# Patient Record
Sex: FEMALE | Race: WHITE
[De-identification: names, ages, dates, MRNs, and addresses within clinical notes are randomized per-mention and may not be internally consistent; named-entity substitution may affect disease eponyms.]

---

## 2018-06-12 ENCOUNTER — HOSPITAL ENCOUNTER (OUTPATIENT)
Dept: HOSPITAL 75 - RAD | Age: 33
End: 2018-06-12
Attending: FAMILY MEDICINE
Payer: MEDICAID

## 2018-06-12 DIAGNOSIS — M43.16: ICD-10-CM

## 2018-06-12 DIAGNOSIS — M48.061: Primary | ICD-10-CM

## 2018-06-12 DIAGNOSIS — F32.9: ICD-10-CM

## 2018-06-12 DIAGNOSIS — M51.16: ICD-10-CM

## 2018-06-12 DIAGNOSIS — M79.2: ICD-10-CM

## 2018-06-12 DIAGNOSIS — M99.73: ICD-10-CM

## 2018-06-12 DIAGNOSIS — M47.816: ICD-10-CM

## 2018-06-12 PROCEDURE — 72148 MRI LUMBAR SPINE W/O DYE: CPT

## 2018-06-12 NOTE — DIAGNOSTIC IMAGING REPORT
PROCEDURE: MRI lumbar spine.



TECHNIQUE: Multiplanar, multisequence MRI of the lumbar spine was

performed without contrast.



DATE: June 12, 2018.



COMPARISON: None.



INDICATION: 33-year-old female, low back pain with bilateral leg

pain and numbness.



FINDINGS: 

There is grade 1 anterolisthesis of L4 on L5 measuring 6.5 mm.

There are bilateral L4 pars interarticularis defects. The

additional alignment of the lumbar spine is unremarkable. There

is moderate disc height loss at L4-L5 with adjacent Modic

endplate degenerative related marrow changes. There is no

evidence of marrow infiltrating or replacing process. The

additional bone marrow signal is unremarkable. There is no

compression deformity. The visualized cord and conus medullaris

is unremarkable and terminates at the L1 level. The additional

disc heights are well preserved.    



L1-L2: There is no disc bulge. The facet joints and ligamentum

flavum are unremarkable. There is no foraminal narrowing. There

is no spinal canal stenosis.



L2-L3: There is no disc bulge. The facet joints and ligamentum

flavum are unremarkable. There is no foraminal narrowing. There

is no spinal canal stenosis.



L3-L4: There is no disc bulge. There are mild bilateral facet

degenerative changes without ligamentum flavum hypertrophy. There

is mild bilateral foraminal narrowing. There is no spinal canal

stenosis.



L4-L5: There is uncovering of the disc relating to the grade 1

anterolisthesis. The facet joints and ligamentum flavum are

unremarkable. There is mild bilateral foraminal narrowing. There

is no spinal canal stenosis.



L5-S1: There is no disc bulge. The facet joints and ligamentum

flavum are unremarkable. There is no foraminal narrowing. There

is no spinal canal stenosis.



IMPRESSION: 

1. Bilateral L4 pars interarticularis defects with associated

grade 1 anterolisthesis of L4 on L5.

2. L3-L4 mild bilateral facet degenerative changes with mild

bilateral foraminal narrowing at this level.

3. Mild bilateral foraminal narrowing at L4-L5. There is moderate

disc height loss at this level.



Dictated by: 



  Dictated on workstation # [id number]

## 2020-03-17 ENCOUNTER — HOSPITAL ENCOUNTER (EMERGENCY)
Dept: HOSPITAL 75 - ER | Age: 35
Discharge: HOME | End: 2020-03-17
Payer: MEDICAID

## 2020-03-17 VITALS — BODY MASS INDEX: 31.25 KG/M2 | HEIGHT: 63.78 IN | WEIGHT: 180.78 LBS

## 2020-03-17 VITALS — DIASTOLIC BLOOD PRESSURE: 86 MMHG | SYSTOLIC BLOOD PRESSURE: 135 MMHG

## 2020-03-17 DIAGNOSIS — F17.210: ICD-10-CM

## 2020-03-17 DIAGNOSIS — Z88.1: ICD-10-CM

## 2020-03-17 DIAGNOSIS — M25.552: Primary | ICD-10-CM

## 2020-03-17 LAB
APTT PPP: YELLOW S
BACTERIA #/AREA URNS HPF: (no result) /HPF
BASOPHILS # BLD AUTO: 0 10^3/UL (ref 0–0.1)
BASOPHILS NFR BLD AUTO: 0 % (ref 0–10)
BILIRUB UR QL STRIP: NEGATIVE
BUN/CREAT SERPL: 17
CALCIUM SERPL-MCNC: 9.2 MG/DL (ref 8.5–10.1)
CHLORIDE SERPL-SCNC: 103 MMOL/L (ref 98–107)
CO2 SERPL-SCNC: 22 MMOL/L (ref 21–32)
CREAT SERPL-MCNC: 0.69 MG/DL (ref 0.6–1.3)
EOSINOPHIL # BLD AUTO: 0.1 10^3/UL (ref 0–0.3)
EOSINOPHIL NFR BLD AUTO: 1 % (ref 0–10)
EOSINOPHIL NFR BLD MANUAL: 1 %
ERYTHROCYTE [DISTWIDTH] IN BLOOD BY AUTOMATED COUNT: 13.5 % (ref 10–14.5)
FIBRINOGEN PPP-MCNC: CLEAR MG/DL
GFR SERPLBLD BASED ON 1.73 SQ M-ARVRAT: > 60 ML/MIN
GLUCOSE SERPL-MCNC: 92 MG/DL (ref 70–105)
GLUCOSE UR STRIP-MCNC: NEGATIVE MG/DL
HCT VFR BLD CALC: 42 % (ref 35–52)
HGB BLD-MCNC: 14.2 G/DL (ref 11.5–16)
KETONES UR QL STRIP: NEGATIVE
LEUKOCYTE ESTERASE UR QL STRIP: (no result)
LYMPHOCYTES # BLD AUTO: 2.9 X 10^3 (ref 1–4)
LYMPHOCYTES NFR BLD AUTO: 20 % (ref 12–44)
MANUAL DIFFERENTIAL PERFORMED BLD QL: YES
MCH RBC QN AUTO: 31 PG (ref 25–34)
MCHC RBC AUTO-ENTMCNC: 34 G/DL (ref 32–36)
MCV RBC AUTO: 92 FL (ref 80–99)
MONOCYTES # BLD AUTO: 1 X 10^3 (ref 0–1)
MONOCYTES NFR BLD AUTO: 7 % (ref 0–12)
MONOCYTES NFR BLD: 8 %
NEUTROPHILS # BLD AUTO: 10.3 X 10^3 (ref 1.8–7.8)
NEUTROPHILS NFR BLD AUTO: 72 % (ref 42–75)
NEUTS BAND NFR BLD MANUAL: 68 %
NITRITE UR QL STRIP: NEGATIVE
PH UR STRIP: 6 [PH] (ref 5–9)
PLATELET # BLD: 265 10^3/UL (ref 130–400)
PMV BLD AUTO: 9.6 FL (ref 7.4–10.4)
POTASSIUM SERPL-SCNC: 4.6 MMOL/L (ref 3.6–5)
PROT UR QL STRIP: NEGATIVE
RBC #/AREA URNS HPF: (no result) /HPF
RBC MORPH BLD: NORMAL
SODIUM SERPL-SCNC: 135 MMOL/L (ref 135–145)
SP GR UR STRIP: 1.01 (ref 1.02–1.02)
SQUAMOUS #/AREA URNS HPF: (no result) /HPF
TRICHOMONAS #/AREA URNS HPF: (no result) /HPF
VARIANT LYMPHS NFR BLD MANUAL: 23 %
WBC # BLD AUTO: 14.3 10^3/UL (ref 4.3–11)
WBC #/AREA URNS HPF: (no result) /HPF

## 2020-03-17 PROCEDURE — 85379 FIBRIN DEGRADATION QUANT: CPT

## 2020-03-17 PROCEDURE — 36415 COLL VENOUS BLD VENIPUNCTURE: CPT

## 2020-03-17 PROCEDURE — 81000 URINALYSIS NONAUTO W/SCOPE: CPT

## 2020-03-17 PROCEDURE — 85007 BL SMEAR W/DIFF WBC COUNT: CPT

## 2020-03-17 PROCEDURE — 85027 COMPLETE CBC AUTOMATED: CPT

## 2020-03-17 PROCEDURE — 80048 BASIC METABOLIC PNL TOTAL CA: CPT

## 2020-03-17 NOTE — ED HIP PAIN/INJURY
General


Chief Complaint:  Hip/Pelvic Problems


Stated Complaint:  L LEG PAIN


Nursing Triage Note:  


PT TO [facility] CO OF L HIP AND LEG PAIN STARTED 3 DAYS AGO. STATES HAS HX OF CHRONIC 


BACK PAIN. PT STATES IS OUT OF HYDROCODONES


Source:  patient


Exam Limitations:  no limitations





History of Present Illness


Date Seen by Provider:  Mar 17, 2020


Time Seen by Provider:  09:40


Initial Comments


Patient presents to ER by private conveyance with chief complaint that she had 

difficulty sleeping last night because of left hip pain. It's been going on for 

about 2-3 days. Progressively worsening. Difficult to put pressure on her left 

hip. She says feels pain anteriorly and deep. She has no history of fracture 

fall or injury to her hip however about 10 days ago she had rolled her right 

ankle is been walking funny because of that. She says her ankle is getting 

better. She typically uses morphine sulfate 15 mg twice a day and oxycodone 5 mg

2-3 times a day. She's been out of the oxycodone for the past 5 or 6 days. She 

takes the opiates because of a bad back which has had extensive workup and i

maging in the past followed by a doctor at [facility]. She does not take ibuprofen 

Aleve or naproxen because she says she has a history of ulcers. She's having no 

GERD, indigestion or nausea presently. She has had urgency of urination recently

and because of her hip pain at the periumbilical her for her to get to the 

bathroom fast enough. No history of kidney stones. No new back pain.





Allergies and Home Medications


Allergies


Coded Allergies:  


     Erythromycin Base (Verified  Allergy, Unknown, 08)





Patient Home Medication List


Home Medication List Reviewed:  Yes





Review of Systems


Constitutional:  No chills, No diaphoresis


EENTM:  No ear discharge, No ear pain


Respiratory:  No cough, No short of breath


Cardiovascular:  No chest pain, No palpitations


Gastrointestinal:  No abdominal pain, No nausea, No vomiting


Genitourinary:  No dysuria; frequency, other (urgency)


Pregnant:  No


Birth Control/STD Prophylaxis:  Other (tubal ligation)


Musculoskeletal:  back pain (chronic), joint pain (left hip)


Skin:  No pruritus, No rash





All Other Systems Reviewed


Negative Unless Noted:  Yes





Past Medical-Social-Family Hx


Patient Social History


Alcohol Use:  Denies Use


Recreational Drug Use:  No


Smoking Status:  Current Everyday Smoker


Type Used:  Cigarettes


Recent Foreign Travel:  No


Contact w/Someone Who Travel:  No


Recent Infectious Disease Expo:  No


Recent Hopitalizations:  No


Physical Abuse:  No


Sexual Abuse:  No





Past Medical History


Surgeries:  No


Cardiac:  No


Reproductive Disorders:  No


GYN History:  Tubal Ligation


Gastrointestinal:  Yes


Musculoskeletal:  No


Endocrine:  No


Psychosocial:  No


Blood Disorders:  No





Physical Exam


Vital Signs





Vital Signs - First Documented








 3/17/20





 09:25


 


Temp 36.7


 


Pulse 68


 


Resp 20


 


B/P (MAP) 135/89 (104)





Capillary Refill : Less Than 3 Seconds


Height, Weight, BMI


Height: '"


Weight: lbs. oz. kg; 31.00 BMI


Method:


General Appearance:  WD/WN, Mild Distress


HEENT:  PERRL/EOMI, Pharynx Normal, Moist Mucous Membranes


Neck:  Full Range of Motion, Normal Inspection


Cardiovascular:  Regular Rate, Rhythm, No Edema


Respiratory:  No Accessory Muscle Use, No Respiratory Distress


Peripheral Pulses:  2+ Radial Pulses (R), 2+ Radial Pulses (L)


Gastrointestinal:  Normal Bowel Sounds, Non Tender, Soft


Extremity:  Normal Capillary Refill, Normal Inspection, Other (tenderness to 

palpation anterior left hip; less so lateral. Tender in the medial thigh with 

varicosities.)


Neurologic/Psychiatric:  Alert, Oriented x3


Skin:  Normal Color, Warm/Dry





Progress/Results/Core Measures


Results/Orders


Lab Results





Laboratory Tests








Test


 3/17/20


09:55 3/17/20


10:44 Range/Units


 


 


Urine Color YELLOW    


 


Urine Clarity CLEAR    


 


Urine pH 6.0   5-9  


 


Urine Specific Gravity 1.010 L  1.016-1.022  


 


Urine Protein NEGATIVE   NEGATIVE  


 


Urine Glucose (UA) NEGATIVE   NEGATIVE  


 


Urine Ketones NEGATIVE   NEGATIVE  


 


Urine Nitrite NEGATIVE   NEGATIVE  


 


Urine Bilirubin NEGATIVE   NEGATIVE  


 


Urine Urobilinogen 0.2   < = 1.0  MG/DL


 


Urine Leukocyte Esterase 2+ H  NEGATIVE  


 


Urine RBC (Auto) 2+ H  NEGATIVE  


 


Urine RBC 0-2    /HPF


 


Urine WBC 0-2    /HPF


 


Urine Squamous Epithelial


Cells 2-5 


 


  /HPF





 


Urine Crystals NONE    /LPF


 


Urine Bacteria TRACE    /HPF


 


Urine Casts NONE    /LPF


 


Urine Mucus NEGATIVE    /LPF


 


Urine Trichomonas FEW H   /HPF


 


Urine Culture Indicated NO    


 


White Blood Count


 


 14.3 H


 4.3-11.0


10^3/uL


 


Red Blood Count


 


 4.61 


 4.35-5.85


10^6/uL


 


Hemoglobin  14.2  11.5-16.0  G/DL


 


Hematocrit  42  35-52  %


 


Mean Corpuscular Volume  92  80-99  FL


 


Mean Corpuscular Hemoglobin  31  25-34  PG


 


Mean Corpuscular Hemoglobin


Concent 


 34 


 32-36  G/DL





 


Red Cell Distribution Width  13.5  10.0-14.5  %


 


Platelet Count


 


 265 


 130-400


10^3/uL


 


Mean Platelet Volume  9.6  7.4-10.4  FL


 


Neutrophils (%) (Auto)  72  42-75  %


 


Lymphocytes (%) (Auto)  20  12-44  %


 


Monocytes (%) (Auto)  7  0-12  %


 


Eosinophils (%) (Auto)  1  0-10  %


 


Basophils (%) (Auto)  0  0-10  %


 


Neutrophils # (Auto)  10.3 H 1.8-7.8  X 10^3


 


Lymphocytes # (Auto)  2.9  1.0-4.0  X 10^3


 


Monocytes # (Auto)  1.0  0.0-1.0  X 10^3


 


Eosinophils # (Auto)


 


 0.1 


 0.0-0.3


10^3/uL


 


Basophils # (Auto)


 


 0.0 


 0.0-0.1


10^3/uL


 


D-Dimer


 


 <= 0.27 


 0.00-0.49


UG/ML


 


Sodium Level  135  135-145  MMOL/L


 


Potassium Level  4.6  3.6-5.0  MMOL/L


 


Chloride Level  103    MMOL/L


 


Carbon Dioxide Level  22  21-32  MMOL/L


 


Anion Gap  10  5-14  MMOL/L


 


Blood Urea Nitrogen  12  7-18  MG/DL


 


Creatinine


 


 0.69 


 0.60-1.30


MG/DL


 


Estimat Glomerular Filtration


Rate 


 > 60 


  





 


BUN/Creatinine Ratio  17   


 


Glucose Level  92    MG/DL


 


Calcium Level  9.2  8.5-10.1  MG/DL








My Orders





Orders - MELY COREAS


Ua Culture If Indicated (3/17/20 09:52)


Pelvis With Left Hip 2-3 Views (3/17/20 09:52)


Ketorolac Injection (Toradol Injection) (3/17/20 10:00)


Cbc With Automated Diff (3/17/20 10:01)


Basic Metabolic Panel (3/17/20 10:01)


Fibrin Degradation Products (3/17/20 10:01)


Manual Differential (3/17/20 10:44)


Oxycodone/Apap 5/325mg Tablet (Percocet (3/17/20 11:30)





Medications Given in ED





Current Medications








 Medications  Dose


 Ordered  Sig/Mehul


 Route  Start Time


 Stop Time Status Last Admin


Dose Admin


 


 Ketorolac


 Tromethamine  60 mg  ONCE  ONCE


 IM  3/17/20 10:00


 3/17/20 10:01 DC 3/17/20 10:13


60 MG








Vital Signs/I&O











 3/17/20





 09:25


 


Temp 36.7


 


Pulse 68


 


Resp 20


 


B/P (MAP) 135/89 (104)














Blood Pressure Mean:                    104











Progress


Progress Note :  


   Time:  10:00


Progress Note


Plan to get an x-ray of her left hip, d-dimer basic labs. Her abdomen is not 

really tender. She's having a fever or other inflammatory signs. We'll start 

with some Toradol, urinalysis and if that doesn't help then we can give her 

oxycodone.





Diagnostic Imaging





   Diagonstic Imaging:  Xray


   Plain Films/CT/US/NM/MRI:  pelvis, hip (left)


Comments


                 [facility] [place], [place]





NAME:   EMY FLORES


MED REC#:   [id number]


ACCOUNT#:   [id number]


PT STATUS:   REG ER


:   1985


PHYSICIAN:   MELY COREAS MD


ADMIT DATE:   20/ER


                                  ***Signed***


Date of Exam:20





PELVIS WITH LEFT HIP 2-3 VIEWS





Indication: Left hip pain





AP view pelvis and 2 views of left hip are obtained





There are postsurgical changes from a tubal ligation. Pelvic ring


is intact. Hip joint spaces are well-maintained. There is no


fracture or dislocation.





IMPRESSION: Negative pelvis and left hip





Dictated by: 





  Dictated on workstation # [id number]





Dict:   20 1027


Trans:   20 1028


 [id number]





Interpreted by:     ANA MEEHAN MD


Electronically signed by: ANA MEEHAN MD 20 1028


   Reviewed:  Reviewed by Me





Departure


Impression





   Primary Impression:  


   Left hip pain


Disposition:  01 HOME, SELF-CARE


Condition:  Stable





Departure-Patient Inst.


Decision time for Depature:  11:38


Referrals:  


NO,LOCAL PHYSICIAN (PCP/Family)


Primary Care Physician


Patient Instructions:  Hip Pain (DC)





Add. Discharge Instructions:  


I suspect your left hip pain is related to your having walked funny is related 

to your right ankle sprain.


For the next week or so you can use the crutches to reduce how much weight you 

put on your hip.


Follow-up next week with your primary care doctor for reevaluation and if your 

symptoms are not improving they can discuss further evaluation and management.


Take the prescription naproxen 1 tablet twice a day on a scheduled basis.


In addition to this you can use Tylenol 1000 mg every 8 hours.


Heating pads and topical creams such as icy hot or Biofreeze can be useful.





All discharge instructions reviewed with patient and/or family. Voiced 

understanding.


Scripts


Omeprazole (Omeprazole) 40 Mg Capsule.dr


40 MG PO DAILY for 14 Days, #14 CAP 0 Refills


   Prov: MELY COREAS         3/17/20 


Naproxen (Naprosyn) 500 Mg Tablet


500 MG PO BID for 14 Days, #30 TAB 0 Refills


   Prov: MELY COREAS         3/17/20


Work/School Note:  Work Release Form   Date Seen in the Emergency Department:  

Mar 17, 2020


   Return to Work:  Mar 18, 2020


   Restrictions:  Need Release from Doctor


   Other Restrictions Listed Below:  Light duty, decreased walking or standing.











MELY COREAS                 Mar 17, 2020 10:00

## 2020-03-17 NOTE — DIAGNOSTIC IMAGING REPORT
Indication: Left hip pain



AP view pelvis and 2 views of left hip are obtained



There are postsurgical changes from a tubal ligation. Pelvic ring

is intact. Hip joint spaces are well-maintained. There is no

fracture or dislocation.



IMPRESSION: Negative pelvis and left hip



Dictated by: 



  Dictated on workstation # [id number]

## 2020-04-22 ENCOUNTER — HOSPITAL ENCOUNTER (OUTPATIENT)
Dept: HOSPITAL 75 - RAD | Age: 35
End: 2020-04-22
Attending: FAMILY MEDICINE
Payer: MEDICAID

## 2020-04-22 DIAGNOSIS — M54.16: Primary | ICD-10-CM

## 2020-04-22 PROCEDURE — 72148 MRI LUMBAR SPINE W/O DYE: CPT

## 2020-04-22 NOTE — DIAGNOSTIC IMAGING REPORT
PROCEDURE: MRI lumbar spine.



TECHNIQUE: Multiplanar, multisequence MRI of the lumbar spine was

performed without contrast.



DATE: April 22, 2020.



COMPARISON: MRI lumbar spine June 12, 2018.



INDICATION: 35-year-old female, increasing low back pain and

radiculopathy.



FINDINGS: 

There is grade 1 anterolisthesis of L4 on L5 measuring 7 mm. This

is unchanged since comparison exam. There are bilateral L4 pars

interarticularis defects. The additional alignment of the lumbar

spine is unremarkable. There is no evidence of a diffuse marrow

infiltrating or replacing process. There is no compression

deformity or other fracture. There is moderate severe disc height

loss at L4-L5. The additional disc heights are well preserved.

The visualized portions of the spinal cord are unremarkable in

signal. The conus medullaris terminates at the level of L1.



L1-L2: There is no disc bulge. The facet joints and ligamentum

flavum are unremarkable. There is no foraminal narrowing. There

is no spinal canal stenosis.



L2-L3: There is no disc bulge. The facet joints and ligamentum

flavum are unremarkable. There is no foraminal narrowing. There

is no spinal canal stenosis.



L3-L4: There is no disc bulge. The facet joints and ligamentum

flavum are unremarkable. There is no foraminal narrowing. There

is no spinal canal stenosis.



L4-L5: There is uncovering of the disc relating to the

anterolisthesis. There is no superimposed disc protrusion or

extrusion. There are mild bilateral facet degenerative changes.

There is mild bilateral foraminal narrowing. There is no spinal

canal stenosis.



L5-S1: There is no disc bulge. The facet joints and ligamentum

flavum are unremarkable. There is no foraminal narrowing. There

is no spinal canal stenosis.



IMPRESSION: 



1. Redemonstrated bilateral L4 pars interarticularis defects

bilaterally with grade 1 anterolisthesis of L4 on L5 measuring 7

mm. This is unchanged since June 12, 2018.

2. There is mild bilateral foraminal narrowing at L4-L5 and

moderate disc height loss at this level which is unchanged since

comparison exam.



Dictated by: 



  Dictated on workstation # [id number]